# Patient Record
Sex: FEMALE
[De-identification: names, ages, dates, MRNs, and addresses within clinical notes are randomized per-mention and may not be internally consistent; named-entity substitution may affect disease eponyms.]

---

## 2022-07-29 ENCOUNTER — APPOINTMENT (OUTPATIENT)
Dept: ORTHOPEDIC SURGERY | Facility: CLINIC | Age: 32
End: 2022-07-29

## 2022-07-29 VITALS — BODY MASS INDEX: 22.2 KG/M2 | HEIGHT: 64 IN | WEIGHT: 130 LBS

## 2022-07-29 PROBLEM — Z00.00 ENCOUNTER FOR PREVENTIVE HEALTH EXAMINATION: Status: ACTIVE | Noted: 2022-07-29

## 2022-07-29 PROCEDURE — 73562 X-RAY EXAM OF KNEE 3: CPT | Mod: LT

## 2022-07-29 PROCEDURE — 20610 DRAIN/INJ JOINT/BURSA W/O US: CPT | Mod: LT

## 2022-07-29 PROCEDURE — 99213 OFFICE O/P EST LOW 20 MIN: CPT | Mod: 25

## 2022-07-29 RX ORDER — TRAMADOL HYDROCHLORIDE 50 MG/1
50 TABLET, COATED ORAL
Qty: 10 | Refills: 0 | Status: ACTIVE | COMMUNITY
Start: 2022-07-29 | End: 1900-01-01

## 2022-07-29 RX ORDER — NABUMETONE 750 MG/1
750 TABLET, FILM COATED ORAL
Qty: 60 | Refills: 3 | Status: ACTIVE | COMMUNITY
Start: 2022-07-29 | End: 1900-01-01

## 2022-07-29 NOTE — PROCEDURE
[Right] : of the right [Knee] : knee [Alcohol] : alcohol [Effusion] : effusion [Call if redness, pain or fever occur] : call if redness, pain or fever occur [] : Patient tolerated procedure well [Apply ice for 15min out of every hour for the next 12-24 hours as tolerated] : apply ice for 15 minutes out of every hour for the next 12-24 hours as tolerated [de-identified] : 78cc [de-identified] : blood

## 2022-07-29 NOTE — DISCUSSION/SUMMARY
[de-identified] :   With the patient's approval, and under sterile technique, a knee aspiration was performed in the office today.  The patient tolerated the procedure well.  See the attached procedure no for further details.\par \par Nabumetone 750 mg and tramadol 50 mg was sent to the patient's pharmacy to help alleviate her symptoms.\par \par She was placed in a knee immobilizer and may weightbear as tolerated.  I instructed patient to keep this knee immobilizer on at all times and only take it off for hygiene purposes.  She understands and will comply.\par \par Patient will follow-up in 2 weeks for further evaluation and repeat x-rays.  All of the patient's questions/concerns were answered in detail.  \par \par The patient was seen under the supervision of Dr. Morales.\par \par \par \par

## 2022-07-29 NOTE — IMAGING
[de-identified] :   Examination of her left knee is as follows:  Mild ecchymosis noted.  Large joint effusion and small prepatellar bursal effusion noted.  No erythema.  Anterior tenderness, patella tenderness, medial and lateral facet of the patella tenderness.  Able to perform active straight leg raise.  Knee flexion from 0-40 degrees with stiffness and pain.  Light touch intact throughout.  Mildly antalgic gait.\par \par X-rays taken of the patient's left knee in the office today revealed a comminuted mildly displaced fracture of the left patella.  Soft tissue swelling noted.  Otherwise, no significant abnormalities are seen.

## 2022-08-11 ENCOUNTER — APPOINTMENT (OUTPATIENT)
Dept: ORTHOPEDIC SURGERY | Facility: CLINIC | Age: 32
End: 2022-08-11

## 2022-08-11 VITALS — BODY MASS INDEX: 22.2 KG/M2 | HEIGHT: 64 IN | WEIGHT: 130 LBS

## 2022-08-11 DIAGNOSIS — S82.042A DISPLACED COMMINUTED FRACTURE OF LEFT PATELLA, INITIAL ENCOUNTER FOR CLOSED FRACTURE: ICD-10-CM

## 2022-08-11 DIAGNOSIS — M25.562 PAIN IN LEFT KNEE: ICD-10-CM

## 2022-08-11 PROCEDURE — 73562 X-RAY EXAM OF KNEE 3: CPT | Mod: LT

## 2022-08-11 PROCEDURE — 99213 OFFICE O/P EST LOW 20 MIN: CPT

## 2022-08-11 NOTE — HISTORY OF PRESENT ILLNESS
[de-identified] : Patient is a 31-year-old female who reports to the office for Subsequent re-evaluation of her left knee pain/patella fracture.  She has been wearing her knee immobilizer as directed and weight-bearing as tolerated.  Certain range of motion and palpating certain areas of the knee still aggravate the patient's pain at times.  Denies any numbness or tingling.

## 2022-08-11 NOTE — IMAGING
[de-identified] :  X-rays taken of the patient's left knee in the office today reveal a well-healing mildly displaced/comminuted fracture of the left patella.  Otherwise, no other significant abnormalities are seen.

## 2022-08-11 NOTE — DISCUSSION/SUMMARY
[de-identified] :   Patient was instructed to remain in her knee immobilizer and weight-bear as tolerated.  She will continue taking nabumetone as needed for pain.\par \par Patient will follow-up in 2 weeks for repeat x-rays and further evaluation.  All of the patient's questions/concerns were answered in detail.  \par \par The patient was seen under the supervision of Dr. Morales.\par

## 2022-08-11 NOTE — PHYSICAL EXAM
[Left] : left knee [] : no atrophy [[id number]] :  not assessed secondary to pain / patella fracture

## 2022-08-23 ENCOUNTER — APPOINTMENT (OUTPATIENT)
Dept: ORTHOPEDIC SURGERY | Facility: CLINIC | Age: 32
End: 2022-08-23

## 2022-08-23 PROCEDURE — 73562 X-RAY EXAM OF KNEE 3: CPT | Mod: LT

## 2022-08-23 PROCEDURE — 99213 OFFICE O/P EST LOW 20 MIN: CPT

## 2022-08-23 NOTE — DISCUSSION/SUMMARY
[de-identified] :  Patient will remain in her knee immobilizer for another 2 weeks.  She may continue to weight-bear as tolerated.  Will transition patient to patella stabilizing brace and repeat x-rays next visit.\par \par Will also likely start the patient on formal PT.  Patient will follow-up in 2 weeks for further evaluation.  All of the patient's questions/concerns were answered in detail.  \par \par The patient was seen under the supervision of Dr. Morales.\par

## 2022-08-23 NOTE — HISTORY OF PRESENT ILLNESS
[de-identified] :   Patient is a 31-year-old female reports office for subsequent re-evaluation of her left knee patella fracture.  She has been weight-bearing as tolerated in her knee immobilizer as directed.  She states that she is doing well and feeling better.

## 2022-08-23 NOTE — IMAGING
[de-identified] : xamination of the left knee is as follows: \par Inspection:   Minimal effusion, but no ecchymosis, no erythema, no atrophy. \par Palpation: medial facet of patella tenderness, anterior tenderness, patellofemoral tenderness,\par ROM:. not assessed secondary to pain / patella fracture. \par Testing: able to do straight leg raise. \par Neuro: light touch is intact throughout. \par Gait:   nonantalgic\par \par \par X-rays taken of the patient's left knee in the office today reveal a well-healing comminuted fracture of the left patella.  Callus formation noted.

## 2022-09-08 ENCOUNTER — APPOINTMENT (OUTPATIENT)
Dept: ORTHOPEDIC SURGERY | Facility: CLINIC | Age: 32
End: 2022-09-08

## 2022-09-08 PROCEDURE — 73562 X-RAY EXAM OF KNEE 3: CPT | Mod: LT

## 2022-09-08 PROCEDURE — 99213 OFFICE O/P EST LOW 20 MIN: CPT

## 2022-09-08 NOTE — HISTORY OF PRESENT ILLNESS
[de-identified] :  Patient is a 31-year-old female who reports the office for subsequent re-evaluation of her left knee pain/patella fracture.  She has been wearing her knee immobilizer and weight-bearing as tolerated.  She states that her pain has been improving with time.  She still admits to mild swelling around her knee.  Denies any numbness or tingling.

## 2022-09-08 NOTE — DISCUSSION/SUMMARY
[de-identified] :   Patient may discontinue her knee immobilizer and transition into a patella stabilizing brace.  She may continue to weight-bear as tolerated.\par \par The patient was advised to rest/ice the area and can alternate with warm compresses as needed.  A script for physical therapy was printed for the patient so they can get started on that.\par \par She will take OTC Tylenol or Motrin as needed for pain.  Patient will follow-up in 6-8 weeks for further evaluation.  All of the patient's questions/concerns were answered in detail.  \par \par Patient was seen under the supervision of Dr. Morales.\par

## 2022-09-08 NOTE — IMAGING
[de-identified] :   Examination of the left knee is as follows:  Mild effusion noted.  No erythema or ecchymosis.  Able to perform active straight leg raise.  Knee flexion from 0-90 degrees with stiffness in pain.  No tenderness to palpation.  Light touch intact throughout.  Negative Diana's.  Nonantalgic gait.\par \par X-rays taken of the patient's left knee in the office today revealed a well-healed comminuted fracture of the patella with callus formation noted.  Otherwise, no other significant abnormalities are seen.  These images were also reviewed by Dr. Morales

## 2022-11-08 ENCOUNTER — NON-APPOINTMENT (OUTPATIENT)
Age: 32
End: 2022-11-08

## 2022-11-08 ENCOUNTER — APPOINTMENT (OUTPATIENT)
Dept: ORTHOPEDIC SURGERY | Facility: CLINIC | Age: 32
End: 2022-11-08

## 2022-11-08 DIAGNOSIS — S82.042D DISPLACED COMMINUTED FRACTURE OF LEFT PATELLA, SUBSEQUENT ENCOUNTER FOR CLOSED FRACTURE WITH ROUTINE HEALING: ICD-10-CM

## 2022-11-08 PROCEDURE — 99213 OFFICE O/P EST LOW 20 MIN: CPT

## 2022-11-08 NOTE — DISCUSSION/SUMMARY
[de-identified] :   Patient is doing well and her pain has subsided.  She completed physical therapy which gave her relief.  She may discontinue her patella stabilizing brace.\par \par Patient will follow-up on as-needed basis.  All of the patient's questions/concerns were answered in detail.  \par \par The patient was seen under the supervision of Dr. Morales.\par

## 2022-11-08 NOTE — HISTORY OF PRESENT ILLNESS
[de-identified] :  Patient is a 31-year-old female who reports the office for subsequent re-evaluation of her left knee.  She states that she completed a full course of physical therapy 2 weeks ago which gave her relief.  Her pain has subsided.  She has been wearing a patella stabilizing brace and would like to discontinue it.  Denies any buckling, locking, or instability of the knee.  Denies any numbness or tingling.